# Patient Record
Sex: FEMALE | URBAN - METROPOLITAN AREA
[De-identification: names, ages, dates, MRNs, and addresses within clinical notes are randomized per-mention and may not be internally consistent; named-entity substitution may affect disease eponyms.]

---

## 2024-10-15 ENCOUNTER — APPOINTMENT (RX ONLY)
Dept: URBAN - METROPOLITAN AREA CLINIC 126 | Facility: CLINIC | Age: 64
Setting detail: DERMATOLOGY
End: 2024-10-15

## 2024-10-15 DIAGNOSIS — L60.1 ONYCHOLYSIS: ICD-10-CM | Status: INADEQUATELY CONTROLLED

## 2024-10-15 PROCEDURE — ? PRESCRIPTION

## 2024-10-15 PROCEDURE — ? COUNSELING

## 2024-10-15 PROCEDURE — 99204 OFFICE O/P NEW MOD 45 MIN: CPT

## 2024-10-15 PROCEDURE — ? OTC TREATMENT REGIMEN

## 2024-10-15 RX ORDER — CICLOPIROX OLAMINE 7.7 MG/100ML
SUSPENSION TOPICAL
Qty: 60 | Refills: 0 | Status: ERX | COMMUNITY
Start: 2024-10-15

## 2024-10-15 RX ADMIN — CICLOPIROX OLAMINE: 7.7 SUSPENSION TOPICAL at 00:00

## 2024-10-15 ASSESSMENT — LOCATION SIMPLE DESCRIPTION DERM: LOCATION SIMPLE: LEFT RING FINGERNAIL

## 2024-10-15 ASSESSMENT — LOCATION ZONE DERM: LOCATION ZONE: FINGERNAIL

## 2024-10-15 ASSESSMENT — LOCATION DETAILED DESCRIPTION DERM: LOCATION DETAILED: LEFT RING FINGERNAIL

## 2024-10-15 NOTE — PROCEDURE: OTC TREATMENT REGIMEN
Patient Specific Otc Recommendations (Will Not Stick From Patient To Patient): [place] nail conditioner, biotin, salt and vinegar soaks
Detail Level: Zone

## 2025-01-13 ENCOUNTER — APPOINTMENT (OUTPATIENT)
Dept: URBAN - METROPOLITAN AREA CLINIC 126 | Facility: CLINIC | Age: 65
Setting detail: DERMATOLOGY
End: 2025-01-13

## 2025-01-13 DIAGNOSIS — Z02.9 ENCOUNTER FOR ADMINISTRATIVE EXAMINATIONS, UNSPECIFIED: ICD-10-CM

## 2025-01-13 PROCEDURE — ? ADDITIONAL NOTES

## 2025-01-14 ENCOUNTER — APPOINTMENT (OUTPATIENT)
Dept: URBAN - METROPOLITAN AREA CLINIC 126 | Facility: CLINIC | Age: 65
Setting detail: DERMATOLOGY
End: 2025-01-14

## 2025-01-14 DIAGNOSIS — L60.1 ONYCHOLYSIS: ICD-10-CM

## 2025-01-14 PROCEDURE — ? COUNSELING

## 2025-01-14 PROCEDURE — ? PRESCRIPTION MEDICATION MANAGEMENT

## 2025-01-14 PROCEDURE — ? ADDITIONAL NOTES

## 2025-01-14 PROCEDURE — ? PRESCRIPTION

## 2025-01-14 PROCEDURE — ? OTC TREATMENT REGIMEN

## 2025-01-14 PROCEDURE — ? NAIL CLIPPING FOR PAS

## 2025-01-14 PROCEDURE — 99214 OFFICE O/P EST MOD 30 MIN: CPT

## 2025-01-14 RX ORDER — CICLOPIROX OLAMINE 7.7 MG/100ML
SUSPENSION TOPICAL
Qty: 60 | Refills: 0 | Status: ERX

## 2025-01-14 ASSESSMENT — LOCATION SIMPLE DESCRIPTION DERM: LOCATION SIMPLE: LEFT RING FINGERNAIL

## 2025-01-14 ASSESSMENT — LOCATION DETAILED DESCRIPTION DERM: LOCATION DETAILED: LEFT RING FINGERNAIL

## 2025-01-14 ASSESSMENT — LOCATION ZONE DERM: LOCATION ZONE: FINGERNAIL

## 2025-01-14 NOTE — PROCEDURE: PRESCRIPTION MEDICATION MANAGEMENT
Continue Regimen: ciclopirox 0.77 % topical suspension \\nQuantity: 60.0 ml  Days Supply: 30\\nSig: Apply to affected nail once daily at night
Render In Strict Bullet Format?: No
Detail Level: Zone
Plan: Patient interested in Lamisil.  Will order labs once nail clipping result back

## 2025-01-14 NOTE — PROCEDURE: ADDITIONAL NOTES
Render Risk Assessment In Note?: no
Additional Notes: Patient had gel nails on last visit.  They are off today and will remain off per patient.  There has been improvement with keeping nails short
Detail Level: Simple

## 2025-01-14 NOTE — PROCEDURE: OTC TREATMENT REGIMEN
Patient Specific Otc Recommendations (Will Not Stick From Patient To Patient): [place] nail conditioner, biotin, salt and vinegar soaks
Detail Level: Zone